# Patient Record
(demographics unavailable — no encounter records)

---

## 2025-05-19 NOTE — HISTORY OF PRESENT ILLNESS
[8] : 8 [0] : 0 [Dull/Aching] : dull/aching [Sharp] : sharp [Rest] : rest [Stairs] : stairs [de-identified] :  05/19/2025: She states 1 month history of pain lateral aspect of the left hip in the distribution of the ASIS.  She notes symptoms are worse at night however she also does note a component of pain radiating down the left lower extremity to the foot with increased activities.  She denies any injury.  She has not tried any medications for this  She denies contributory past medical history [] : This patient has had an injection before: no [FreeTextEntry1] : left hip [FreeTextEntry5] : left lateral hip pain for around a month w/o known ANATOLIY. pt states pain is worse at night. no prior injury/tx. pain can radiate down to left ankle. can ambulate well.  [FreeTextEntry7] : down to left ankle.  [de-identified] : none

## 2025-05-19 NOTE — PHYSICAL EXAM
[Left] : left hip [NL (120)] : flexion 120 degrees [NL (30)] : extension 30 degrees [NL (35)] : adduction 35 degrees [NL (45)] : internal rotation 45 degrees [5___] : adduction 5[unfilled]/5 [de-identified] : Episodic left leg radicular complaints to the level of the foot [] : non-antalgic

## 2025-05-19 NOTE — HISTORY OF PRESENT ILLNESS
[8] : 8 [0] : 0 [Dull/Aching] : dull/aching [Sharp] : sharp [Rest] : rest [Stairs] : stairs [de-identified] :  05/19/2025: She states 1 month history of pain lateral aspect of the left hip in the distribution of the ASIS.  She notes symptoms are worse at night however she also does note a component of pain radiating down the left lower extremity to the foot with increased activities.  She denies any injury.  She has not tried any medications for this  She denies contributory past medical history [] : This patient has had an injection before: no [FreeTextEntry1] : left hip [FreeTextEntry5] : left lateral hip pain for around a month w/o known ANATOLIY. pt states pain is worse at night. no prior injury/tx. pain can radiate down to left ankle. can ambulate well.  [FreeTextEntry7] : down to left ankle.  [de-identified] : none

## 2025-05-19 NOTE — IMAGING
[No bony abnormalities] : No bony abnormalities [FreeTextEntry1] : normal [de-identified] : normal

## 2025-05-19 NOTE — ASSESSMENT
[FreeTextEntry1] : - ASIS bursitis - Rx for diclofenac is provided  The patient's orthopaedic condition(s) warrants intermittent use of a prescription strength non-steroidal anti-inflammatory medication.  These medications are associated with risks including but not limited to gastrointestinal irritation, kidney damage, hypertension, and bleeding.  The patient understands and will take medications as prescribed.  The patient will stop the medication and consult a physician as needed if problems arise. - Prescription for physical therapy is provided She has a component of lumbar radiculopathy as well if the point tenderness over the AIIS persist would consider cortisone injection

## 2025-05-19 NOTE — PHYSICAL EXAM
[Left] : left hip [NL (120)] : flexion 120 degrees [NL (30)] : extension 30 degrees [NL (35)] : adduction 35 degrees [NL (45)] : internal rotation 45 degrees [5___] : adduction 5[unfilled]/5 [de-identified] : Episodic left leg radicular complaints to the level of the foot [] : non-antalgic

## 2025-05-19 NOTE — IMAGING
[No bony abnormalities] : No bony abnormalities [FreeTextEntry1] : normal [de-identified] : normal